# Patient Record
(demographics unavailable — no encounter records)

---

## 2024-10-09 NOTE — PHYSICAL EXAM
[NL] : warm, clear [FreeTextEntry7] : Loud barking cough appreciated multiple times during encounter. No stridor at rest

## 2024-10-09 NOTE — HISTORY OF PRESENT ILLNESS
[de-identified] : Fever and cough [FreeTextEntry6] : 4 year old male presenting with fever and cough. Tmax of 102F noted over night. Associated with loud persistent cough. No vomiting, diarrhea, rash. Mild runny nose. Eating/drinking well.

## 2024-10-09 NOTE — DISCUSSION/SUMMARY
[FreeTextEntry1] : 4 year old male presenting with fever and cough. On exam, well appearing child in no acute distress. No stridor appreciated at this time. (+) Barking cough consistent with viral croup.  -PO steroids. Given to father and instructed on how to give to child -Recommend exposure to cold air, cool mist humidifier, stream exposure, elevated HOB -Signs/symptoms that would warrant ER evaluation reviewed with father -Call/RTC if any concerns
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
## 2025-05-28 NOTE — DEVELOPMENTAL MILESTONES
[Dresses and undresses without help] : dresses and undresses without help [Goes to the bathroom independently] : goes to bathroom independently [Is dry through the day] :  is dry through the day [Tells a story of 2 sentences or more] : tells a story of 2 sentences or more [Follows directions for 4 individual] : follows directions for 4 individual prepositions [Counts 5 objects] : counts 5 objects [Names 3 or more numbers] : names 3 or more numbers [Names 4 or more letters out of order] : names 4 or more letters out of order [Walks on tiptoes when asked] : walks on tiptoes when asked [Catches a bounced ball with] : catches a bounced ball with 2 hands [Copies a triangle] : copies a triangle [Draws a 6-part person] : draws a 6-part person [Copies first name] : copies first name [Cuts well with scissors] : cuts well with scissors [Writes 2 or more letters] : writes 2 or more letters [Spreads with a knife] : does not spread with a knife [Is beginning to skip] : is not beginning to skip [FreeTextEntry1] : Gets OT/PT/Speech